# Patient Record
Sex: FEMALE | URBAN - METROPOLITAN AREA
[De-identification: names, ages, dates, MRNs, and addresses within clinical notes are randomized per-mention and may not be internally consistent; named-entity substitution may affect disease eponyms.]

---

## 2019-12-11 ENCOUNTER — APPOINTMENT (RX ONLY)
Dept: URBAN - METROPOLITAN AREA CLINIC 1 | Facility: CLINIC | Age: 39
Setting detail: DERMATOLOGY
End: 2019-12-11

## 2019-12-11 DIAGNOSIS — Z41.9 ENCOUNTER FOR PROCEDURE FOR PURPOSES OTHER THAN REMEDYING HEALTH STATE, UNSPECIFIED: ICD-10-CM

## 2019-12-11 PROCEDURE — ? BOTOX

## 2019-12-11 NOTE — PROCEDURE: BOTOX
Left Periorbital Skin Units: 0
Price (Use Numbers Only, No Special Characters Or $): 820
Dilution (U/0.1 Cc): 1
Additional Area 6 Location: chin
Show Right And Left Pupillary Line Units: No
Show Periorbital Units: Yes
Additional Area 3 Location: bunny lines
Glabellar Complex Units: 25
Additional Area 1 Units: 10
Additional Area 5 Location: brow
Detail Level: Zone
Consent: Written consent obtained. Risks include but not limited to lid/brow ptosis, bruising, swelling, diplopia, temporary effect, incomplete chemical denervation.
Additional Area 2 Location: masseter, L and R
Expiration Date (Month Year): 5/22
Lot #: 
Additional Area 4 Location: lip upper
Forehead Units: 3
Post-Care Instructions: Patient instructed to not lie down for 4 hours and limit physical activity for 24 hours. Patient instructed not to travel by airplane for 48 hours.
Additional Area 1 Location: [facility]